# Patient Record
(demographics unavailable — no encounter records)

---

## 2025-05-25 NOTE — HISTORY OF PRESENT ILLNESS
"XARELTO 20 MG TABS tablet        Last Written Prescription Date:  12/6/18  Last Fill Quantity: 90   # refills: 2  Last Office Visit : 11/7/18. \"Stroke prophylaxis:  CHADSVasc score is 2 for hypertension and female gender.  Continue Xarelto.\"    Future Office visit:  9/18/19    Per CareEverywhere:  1/23/19  9:26 AM     HEMOGRAM/PLTS   Order: [id number]   (suggestion)  Information displayed in this report will not trend or trigger automated decision support.    Ref Range & Units Value   WBC 4.0 - 11.0 k/ul 6.4    RBC 4.0 - 5.2 M/ul 4.51    Hemoglobin 12.0 - 16.0 g/dl 13.7    HCT 36.0 - 46.0 % 40.2    MCV 80 - 100 fl 89.1    MCH 26 - 34 pg 30.4    MCHC 32 - 36 g/dl 34.1    RDW 11.5 - 14.5 % 12.7    Platelets 150 - 450 k/ul 272    MPV 6.5 - 11.0 fl 9.4    Resulting Agency  [facility]     Warnings Override History for XARELTO 20 MG TABS tablet [[id number]]     Overridden by Lilliam Olmedo RN on Dec 6, 2018 6:09 PM   Drug-Drug   1. SALICYLATES / DIRECT FACTOR XA INHIBITORS [Level: Major]   Other Orders: aspirin 81 MG tablet                " [Partner] : partner [Other Location: e.g. School (Enter Location, City,State)___] : at [unfilled], at the time of the visit. [Medical Office: ([facility])___] : at the medical office located in  [Telehealth (audio & video)] : This visit was provided via telehealth using real-time 2-way audio visual technology. [Verbal consent obtained from patient] : the patient, [unfilled]